# Patient Record
Sex: MALE | Race: WHITE | NOT HISPANIC OR LATINO | Employment: FULL TIME | URBAN - METROPOLITAN AREA
[De-identification: names, ages, dates, MRNs, and addresses within clinical notes are randomized per-mention and may not be internally consistent; named-entity substitution may affect disease eponyms.]

---

## 2019-03-22 ENCOUNTER — CONSULT (OUTPATIENT)
Dept: SLEEP MEDICINE | Facility: HOSPITAL | Age: 54
End: 2019-03-22

## 2019-03-22 VITALS
SYSTOLIC BLOOD PRESSURE: 149 MMHG | HEIGHT: 70 IN | HEART RATE: 57 BPM | OXYGEN SATURATION: 94 % | WEIGHT: 296.4 LBS | DIASTOLIC BLOOD PRESSURE: 87 MMHG | BODY MASS INDEX: 42.43 KG/M2

## 2019-03-22 DIAGNOSIS — E66.01 MORBID OBESITY (HCC): ICD-10-CM

## 2019-03-22 DIAGNOSIS — R06.83 SNORING: Primary | ICD-10-CM

## 2019-03-22 DIAGNOSIS — G47.33 OBSTRUCTIVE SLEEP APNEA, ADULT: ICD-10-CM

## 2019-03-22 PROCEDURE — 99204 OFFICE O/P NEW MOD 45 MIN: CPT | Performed by: INTERNAL MEDICINE

## 2019-03-22 RX ORDER — FLUVOXAMINE MALEATE 150 MG/1
CAPSULE, EXTENDED RELEASE ORAL
COMMUNITY
Start: 2019-02-05

## 2019-03-22 RX ORDER — LAMOTRIGINE 200 MG/1
TABLET ORAL
COMMUNITY
Start: 2019-02-05

## 2019-03-22 RX ORDER — BUPROPION HYDROCHLORIDE 300 MG/1
TABLET ORAL
COMMUNITY
Start: 2019-02-05

## 2019-03-22 RX ORDER — BUSPIRONE HYDROCHLORIDE 30 MG/1
TABLET ORAL
COMMUNITY
Start: 2019-03-01

## 2019-03-22 RX ORDER — PROPRANOLOL HYDROCHLORIDE 80 MG/1
CAPSULE, EXTENDED RELEASE ORAL
COMMUNITY
Start: 2019-02-19

## 2019-03-22 RX ORDER — AMLODIPINE BESYLATE 10 MG/1
TABLET ORAL
COMMUNITY
Start: 2019-02-05

## 2019-03-22 RX ORDER — LOSARTAN POTASSIUM 100 MG/1
TABLET ORAL
COMMUNITY
Start: 2019-01-08

## 2019-03-24 PROBLEM — L03.012 CELLULITIS OF FINGER OF LEFT HAND: Status: ACTIVE | Noted: 2019-03-24

## 2019-03-24 PROBLEM — T14.8XXA SPLINTER IN SKIN: Status: ACTIVE | Noted: 2019-03-24

## 2019-03-24 NOTE — PROGRESS NOTES
Subjective   Jono Lennon is a 53 y.o. male is being seen for consultation today at the request of Dr. Odell for the evaluation of snoring and obstructive sleep apnea.  He is also seen by Dr. Joao Palmer.    History of Present Illness  Patient says he had snoring noted for at least 15 years apnea is been for about 12 years.  He had a sleep study until possible in 2009 which showed significant sleep apnea.  He has been on CPAP since then but he says that he cannot tell much difference.  He says he feels about the same.  He still is using the same machine that he had in 2009.  He has gained about 60 pounds since that study.  He denies snoring when he is wearing the mask.  He says he is not rested in the morning.  He has a morning headache about 1 day/month.  He will fall asleep if sitting quietly during the day.  he naps sometimes 1-4 hours during the day.  He denies any problems while driving.  He is sleepy even if he increases his time in bed.    Without the machine he snores loudly and snores in all positions.  He is awakened with a dry mouth, gasping, coughing, and with a sore throat.  He has trouble breathing through his nose both day and night but denies ever breaking his nose.  Occasional reflux symptoms but controls it with diet over-the-counter medications.  He denies any hypnagogic hallucinations or sleep paralysis.  He has occasional kicking of his legs at night.  He is not on any medications.  He denies any chronic pain.  He thinks his weight is at the same for the past year.    To get about 11 PM.  He will fall asleep in 30 minutes although it has taken him as his 2 hours.  He thinks he is awake and 10-20 times during the night.  He arises at 5 AM and thinks he gets about 6 hours of sleep.  He often naps in the evenings.  This may be 2-4 hours.  He has a history of hypertension known for 15 years.  He denies any history of diabetes or coronary artery disease.  He has a history of anxiety, depression, and  OCD as well as ADHD.  Allergies   Allergen Reactions   • Penicillins Hives     As a child   He also has environmental allergies.      Current Outpatient Medications:   •  amLODIPine (NORVASC) 10 MG tablet, , Disp: , Rfl:   •  Amphetamine-Dextroamphetamine (ADDERALL PO), Take 20 mg by mouth 2 (Two) Times a Day., Disp: , Rfl:   •  buPROPion XL (WELLBUTRIN XL) 300 MG 24 hr tablet, , Disp: , Rfl:   •  busPIRone (BUSPAR) 30 MG tablet, , Disp: , Rfl:   •  Dextroamphetamine Sulfate (ZENZEDI PO), Take  by mouth., Disp: , Rfl:   •  fluvoxaMINE maleate  MG capsule sustained-release 24 hr, , Disp: , Rfl:   •  lamoTRIgine (LaMICtal) 200 MG tablet, , Disp: , Rfl:   •  losartan (COZAAR) 100 MG tablet, , Disp: , Rfl:   •  propranolol LA (INDERAL LA) 80 MG 24 hr capsule, , Disp: , Rfl:     Social History    Tobacco Use      Smoking status: Passive Smoke Exposure - Never Smoker      Smokeless tobacco: Never Used       Social History     Substance and Sexual Activity   Alcohol Use Yes    Comment: rare-less than 1 drink a month       Caffeine: He has 1 cup of coffee per month but has a 12 pack of cola per day    Past Medical History:   Diagnosis Date   • Arthritis    • Hypertension    • Mental disorder        Surgical history: skin cancer removal.    Family History   Problem Relation Age of Onset   • Heart disease Mother    • Hypertension Mother    • Obesity Mother    • Sleep apnea Mother    • Hypersomnolence Mother    • COPD Mother    • Diabetes Father    • Obesity Father    • Sleep apnea Father    • Hypersomnolence Father    • COPD Father    • Asthma Father    • Cancer Father         lung       The following portions of the patient's history were reviewed and updated as appropriate: allergies, current medications, past family history, past medical history, past social history, past surgical history and problem list.    Review of Systems   Constitutional: Positive for fatigue.   HENT: Positive for hearing loss, postnasal drip  "and sinus pressure.    Eyes: Negative.    Respiratory: Positive for shortness of breath and wheezing.    Cardiovascular: Positive for leg swelling.   Gastrointestinal: Positive for constipation.        He complains of frequent heartburn.   Endocrine: Negative.    Genitourinary: Negative.    Musculoskeletal: Positive for arthralgias.   Skin: Negative.    Allergic/Immunologic: Positive for environmental allergies.   Neurological: Positive for tremors.   Hematological: Negative.    Psychiatric/Behavioral: Positive for confusion, decreased concentration and dysphoric mood. The patient is nervous/anxious.    [place] score 12/24    Objective     /87   Pulse 57   Ht 177.8 cm (70\")   Wt 134 kg (296 lb 6.4 oz)   SpO2 94%   BMI 42.53 kg/m²      Physical Exam   Constitutional: He is oriented to person, place, and time. He appears well-developed and well-nourished.   He is morbidly obese.   HENT:   Head: Normocephalic and atraumatic.   He has Mallampati class II anatomy and mild retrognathia   Eyes: EOM are normal. Pupils are equal, round, and reactive to light.   Neck: Normal range of motion. Neck supple.   Cardiovascular: Normal rate, regular rhythm and normal heart sounds.   Pulmonary/Chest: Effort normal and breath sounds normal.   Abdominal: Soft. Bowel sounds are normal.   Musculoskeletal: Normal range of motion. He exhibits no edema.   Neurological: He is alert and oriented to person, place, and time.   Skin: Skin is warm and dry.   Psychiatric: He has a normal mood and affect. His behavior is normal.   His sleep study at [facility] July 5, 2009 showed an AHI of 48.4.  He also had a periodic limb movement of the right index 17.1.  He had oxygen desaturations to 78% and 26.7% of his total sleep time desaturated state.      Assessment/Plan   Jono was seen today for sleeping problem.    Diagnoses and all orders for this visit:    Snoring  -     Polysomnography 4 or More Parameters With CPAP; Future  -     " Detailed AutoPAP Order    Obstructive sleep apnea, adult  -     Polysomnography 4 or More Parameters With CPAP; Future  -     Detailed AutoPAP Order    Morbid obesity (CMS/HCC)    Patient presents with history as noted.  He is previously been diagnosed with severe obstructive sleep apnea and has gained significant weight since that study.  He seems very likely to continue to have severe obstructive sleep apnea.  He does not seem to have a symptom control with pressure settings at present.  We need to proceed to a titration study to make sure he is on an adequate pressure.  We can also see if you are having significant leg movements.  He states that he is working on his diet.  We will see him after his study.  He is encouraged to lose weight.  He is encouraged to avoid alcohol and sedatives close to bedtime.  He is encouraged to practice lateral position sleep.         Phong Miller MD [facility]  Sleep Medicine  Pulmonary and Critical Care Medicine

## 2019-04-16 ENCOUNTER — APPOINTMENT (OUTPATIENT)
Dept: SLEEP MEDICINE | Facility: HOSPITAL | Age: 54
End: 2019-04-16

## 2019-06-24 ENCOUNTER — OFFICE VISIT (OUTPATIENT)
Dept: SLEEP MEDICINE | Facility: HOSPITAL | Age: 54
End: 2019-06-24

## 2019-06-24 VITALS
SYSTOLIC BLOOD PRESSURE: 149 MMHG | WEIGHT: 304.8 LBS | DIASTOLIC BLOOD PRESSURE: 85 MMHG | HEIGHT: 69 IN | HEART RATE: 60 BPM | BODY MASS INDEX: 45.14 KG/M2 | OXYGEN SATURATION: 95 %

## 2019-06-24 DIAGNOSIS — G47.33 OBSTRUCTIVE SLEEP APNEA, ADULT: Primary | ICD-10-CM

## 2019-06-24 DIAGNOSIS — G47.10 HYPERSOMNIA WITH SLEEP APNEA: ICD-10-CM

## 2019-06-24 DIAGNOSIS — G47.30 HYPERSOMNIA WITH SLEEP APNEA: ICD-10-CM

## 2019-06-24 PROCEDURE — 99213 OFFICE O/P EST LOW 20 MIN: CPT | Performed by: NURSE PRACTITIONER

## 2019-06-24 RX ORDER — CARIPRAZINE 3 MG/1
CAPSULE, GELATIN COATED ORAL
COMMUNITY
Start: 2019-06-20

## 2019-06-24 NOTE — PROGRESS NOTES
Subjective: Follow-up        Chief Complaint:   Chief Complaint   Patient presents with   • Follow-up       HPI:    Jono Lennon is a 53 y.o. male here for follow-up sleep apnea.  Patient was last seen 3/22/2019.  Patient was originally diagnosed in 2009.  He has been on CPAP since that time.  Patient states he is still excessively sleepy and must take a 2-hour nap each day.  Patient does not notice any difference being on CPAP than before he started.  Patient is sleeping 7 hours nightly and does feel refreshed for the first 2 hours after awakening.  Patient has an [place] score of 3/24.  Patient wishes to continue compliance with CPAP but does wish she was feeling better.  Patient is currently on Adderall and Dexedrine and this has not helped.        Current medications are:   Current Outpatient Medications:   •  amLODIPine (NORVASC) 10 MG tablet, , Disp: , Rfl:   •  Amphetamine-Dextroamphetamine (ADDERALL PO), Take 20 mg by mouth 2 (Two) Times a Day., Disp: , Rfl:   •  buPROPion XL (WELLBUTRIN XL) 300 MG 24 hr tablet, , Disp: , Rfl:   •  busPIRone (BUSPAR) 30 MG tablet, , Disp: , Rfl:   •  Dextroamphetamine Sulfate (ZENZEDI PO), Take  by mouth., Disp: , Rfl:   •  fluvoxaMINE maleate  MG capsule sustained-release 24 hr, , Disp: , Rfl:   •  lamoTRIgine (LaMICtal) 200 MG tablet, , Disp: , Rfl:   •  losartan (COZAAR) 100 MG tablet, , Disp: , Rfl:   •  propranolol LA (INDERAL LA) 80 MG 24 hr capsule, , Disp: , Rfl:   •  VRAYLAR 3 MG capsule, , Disp: , Rfl: .      The patient's relevant past medical, surgical, family and social history were reviewed and updated in Epic as appropriate.       Review of Systems   Constitutional: Positive for fatigue.   HENT: Positive for hearing loss.    Respiratory: Positive for apnea, shortness of breath and wheezing.    Cardiovascular: Positive for leg swelling.   Gastrointestinal: Positive for constipation.        Heartburn   Musculoskeletal: Positive for arthralgias.    Allergic/Immunologic: Positive for environmental allergies.   Neurological: Positive for tremors.   Psychiatric/Behavioral: Positive for confusion, decreased concentration and sleep disturbance. The patient is nervous/anxious.    All other systems reviewed and are negative.        Objective:    Physical Exam   Constitutional: He is oriented to person, place, and time. He appears well-developed and well-nourished.   HENT:   Head: Normocephalic and atraumatic.   Mouth/Throat: Oropharynx is clear and moist.   Mallampati 2 anatomy   Eyes: Conjunctivae are normal.   Neck: Neck supple. No thyromegaly present.   Cardiovascular: Normal rate and regular rhythm.   Pulmonary/Chest: Effort normal and breath sounds normal.   Lymphadenopathy:     He has no cervical adenopathy.   Neurological: He is alert and oriented to person, place, and time.   Skin: Skin is warm and dry.   Psychiatric: He has a normal mood and affect. His behavior is normal. Judgment and thought content normal.   Nursing note and vitals reviewed.  73/70 3 days of use.  Greater than 4-hour use 100%.  IPAP 90% pressure 12.5.  EPAP 90% pressure 8.1.  AHI 1.1.  Download reviewed with patient.      ASSESSMENT/PLAN    Don was seen today for follow-up.    Diagnoses and all orders for this visit:    Obstructive sleep apnea, adult  -     CPAP Therapy  -     Polysomnography 4 or More Parameters With CPAP; Future    Hypersomnia with sleep apnea  -     Polysomnography 4 or More Parameters With CPAP; Future            1. Counseled patient regarding multimodal approach with healthy nutrition, healthy sleep, regular physical activity, social activities, counseling, and medications. Encouraged to practice lateral sleep position. Avoid alcohol and sedatives close to bedtime.  2. Even though patient is compliant has a normal AHI he is still excessively sleepy in the day and does need to take a nap daily.  I scheduled a titration and I will see patient back 10 weeks after his  study to reassess.    I have reviewed the results of my evaluation and impression and discussed my recommendations in detail with the patient.      Signed by  Doris Mello, APRN    June 24, 2019      CC: Nicholas Odell MD          No ref. provider found

## 2019-08-08 ENCOUNTER — HOSPITAL ENCOUNTER (OUTPATIENT)
Dept: SLEEP MEDICINE | Facility: HOSPITAL | Age: 54
Discharge: HOME | End: 2019-08-08
Admitting: NURSE PRACTITIONER

## 2019-08-08 VITALS
BODY MASS INDEX: 43.84 KG/M2 | OXYGEN SATURATION: 97 % | HEIGHT: 70 IN | DIASTOLIC BLOOD PRESSURE: 88 MMHG | HEART RATE: 66 BPM | SYSTOLIC BLOOD PRESSURE: 151 MMHG | WEIGHT: 306.25 LBS

## 2019-08-08 DIAGNOSIS — G47.33 OBSTRUCTIVE SLEEP APNEA, ADULT: ICD-10-CM

## 2019-08-08 DIAGNOSIS — G47.30 HYPERSOMNIA WITH SLEEP APNEA: ICD-10-CM

## 2019-08-08 DIAGNOSIS — G47.10 HYPERSOMNIA WITH SLEEP APNEA: ICD-10-CM

## 2019-08-08 PROCEDURE — 95811 POLYSOM 6/>YRS CPAP 4/> PARM: CPT | Performed by: INTERNAL MEDICINE

## 2019-08-08 PROCEDURE — 95811 POLYSOM 6/>YRS CPAP 4/> PARM: CPT

## 2019-08-12 DIAGNOSIS — G47.61 PLMD (PERIODIC LIMB MOVEMENT DISORDER): ICD-10-CM

## 2019-08-12 DIAGNOSIS — G47.33 OSA TREATED WITH BIPAP: Primary | ICD-10-CM

## 2019-08-12 DIAGNOSIS — R06.83 SNORING: ICD-10-CM

## 2019-08-12 RX ORDER — ROPINIROLE 0.25 MG/1
0.25 TABLET, FILM COATED ORAL NIGHTLY
Qty: 30 TABLET | Refills: 2 | Status: SHIPPED | OUTPATIENT
Start: 2019-08-12 | End: 2019-08-30 | Stop reason: DRUGHIGH

## 2019-08-14 NOTE — PROGRESS NOTES
CALLED PATIENT REGARDING TITRATION RESULTS. REACHED  REQUESTING RETURN CALL TO SCHEDULE FU. THEREA RE OPENINGS ON KELLY'S SCHEDULED FOR 08/15/19 THAT I HAVE BLOCKED SHOULD HE CALL BACK TODAY

## 2019-08-30 ENCOUNTER — OFFICE VISIT (OUTPATIENT)
Dept: SLEEP MEDICINE | Facility: HOSPITAL | Age: 54
End: 2019-08-30

## 2019-08-30 VITALS
WEIGHT: 303 LBS | HEART RATE: 60 BPM | OXYGEN SATURATION: 94 % | SYSTOLIC BLOOD PRESSURE: 134 MMHG | DIASTOLIC BLOOD PRESSURE: 81 MMHG | BODY MASS INDEX: 43.38 KG/M2 | HEIGHT: 70 IN

## 2019-08-30 DIAGNOSIS — G47.33 OSA (OBSTRUCTIVE SLEEP APNEA): Primary | ICD-10-CM

## 2019-08-30 DIAGNOSIS — G47.61 PLMD (PERIODIC LIMB MOVEMENT DISORDER): ICD-10-CM

## 2019-08-30 DIAGNOSIS — G47.30 HYPERSOMNIA WITH SLEEP APNEA: ICD-10-CM

## 2019-08-30 DIAGNOSIS — G47.10 HYPERSOMNIA WITH SLEEP APNEA: ICD-10-CM

## 2019-08-30 PROCEDURE — 99213 OFFICE O/P EST LOW 20 MIN: CPT | Performed by: NURSE PRACTITIONER

## 2019-08-30 RX ORDER — ROPINIROLE 0.5 MG/1
0.5 TABLET, FILM COATED ORAL NIGHTLY
Qty: 30 TABLET | Refills: 4 | Status: SHIPPED | OUTPATIENT
Start: 2019-08-30 | End: 2020-09-23

## 2019-08-30 NOTE — PROGRESS NOTES
Chief Complaint:   Chief Complaint   Patient presents with   • Follow-up       HPI:    Jono Lennon is a 53 y.o. male here for follow-up of sleep apnea and hypersomnia with compliance of CPAP therapy.  Patient also has PLMD.  Patient had a titration study for excessive sleepiness on 8/8/2019 that showed moderate obstructive sleep apnea.  Patient was adjusted to 25/15 settings.  Patient states he is currently on the settings does not see any difference in his hypersomnia.  Patient was also started on Requip 0.25 mg but does not feel he has noticed any difference.  We have discussed increasing that today and patient would like to do so.  Of note, patient is on Adderall 20 mg twice daily also Dexedrine twice daily for ADD.  Patient does not feel these are helping with his hypersomnia.  Patient states he sleeps approximately 10 hours daily and has no problem going to sleep at bedtime is sleeping all night.        Current medications are:   Current Outpatient Medications:   •  amLODIPine (NORVASC) 10 MG tablet, , Disp: , Rfl:   •  Amphetamine-Dextroamphetamine (ADDERALL PO), Take 20 mg by mouth 2 (Two) Times a Day., Disp: , Rfl:   •  azithromycin (ZITHROMAX) 250 MG tablet, Take 1 tablet by mouth Daily. Take 2 tablets the first day, then 1 tablet daily for 4 days., Disp: 6 tablet, Rfl: 0  •  buPROPion XL (WELLBUTRIN XL) 300 MG 24 hr tablet, , Disp: , Rfl:   •  busPIRone (BUSPAR) 30 MG tablet, , Disp: , Rfl:   •  Dextroamphetamine Sulfate (ZENZEDI PO), Take  by mouth., Disp: , Rfl:   •  fluvoxaMINE maleate  MG capsule sustained-release 24 hr, , Disp: , Rfl:   •  lamoTRIgine (LaMICtal) 200 MG tablet, , Disp: , Rfl:   •  losartan (COZAAR) 100 MG tablet, , Disp: , Rfl:   •  propranolol LA (INDERAL LA) 80 MG 24 hr capsule, , Disp: , Rfl:   •  VRAYLAR 3 MG capsule, , Disp: , Rfl:   •  predniSONE (DELTASONE) 10 MG (21) tablet pack, Take  by mouth Daily. Use as directed on package, Disp: 21 tablet, Rfl: 0  •  rOPINIRole  (REQUIP) 0.5 MG tablet, Take 1 tablet by mouth Every Night. Take 1 hour before bedtime., Disp: 30 tablet, Rfl: 4.      The patient's relevant past medical, surgical, family and social history were reviewed and updated in Epic as appropriate.       Review of Systems   Constitutional: Positive for fatigue.   HENT: Positive for hearing loss.    Respiratory: Positive for apnea, shortness of breath and wheezing.    Cardiovascular: Positive for leg swelling.   Gastrointestinal: Positive for constipation.        Heartburn   Musculoskeletal: Positive for arthralgias.   Allergic/Immunologic: Positive for environmental allergies.   Neurological: Positive for tremors.   Psychiatric/Behavioral: Positive for confusion, decreased concentration and sleep disturbance. The patient is nervous/anxious.    All other systems reviewed and are negative.        Objective:    Physical Exam   Constitutional: He is oriented to person, place, and time. He appears well-developed and well-nourished.   HENT:   Head: Normocephalic and atraumatic.   Mouth/Throat: Oropharynx is clear and moist.   Class 2 airway   Eyes: Conjunctivae are normal.   Neck: Neck supple. No thyromegaly present.   Cardiovascular: Normal rate and regular rhythm.   Pulmonary/Chest: Effort normal and breath sounds normal.   Lymphadenopathy:     He has no cervical adenopathy.   Neurological: He is alert and oriented to person, place, and time.   Skin: Skin is warm and dry.   Psychiatric: He has a normal mood and affect. His behavior is normal. Judgment and thought content normal.   Nursing note and vitals reviewed.        ASSESSMENT/PLAN    Jono was seen today for follow-up.    Diagnoses and all orders for this visit:    REJI (obstructive sleep apnea)  -     CPAP Therapy    PLMD (periodic limb movement disorder)  -     rOPINIRole (REQUIP) 0.5 MG tablet; Take 1 tablet by mouth Every Night. Take 1 hour before bedtime.    Hypersomnia with sleep apnea            1. Counseled patient  regarding multimodal approach with healthy nutrition, healthy sleep, regular physical activity, social activities, counseling, and medications. Encouraged to practice lateral  sleep position. Avoid alcohol and sedatives close to bedtime.  2. Fill supplies x1 year.  3. Increase Requip to 0.5 mg 1 hour before bedtime #30 with 3 refills given.  4. We will try to get control of patient's restless legs before trying any different medications.  He is already on large dose of stimulants and we do not want to change this at this time.  This plan of care has been discussed with Dr. Phong Miller.  See patient back in 8 weeks to reassess.  I have reviewed the results of my evaluation and impression and discussed my recommendations in detail with the patient.      Signed by  KIARRA Ennis    August 30, 2019      CC: Nicholas Odell MD          No ref. provider found

## 2020-09-23 ENCOUNTER — TELEMEDICINE (OUTPATIENT)
Dept: SLEEP MEDICINE | Facility: HOSPITAL | Age: 55
End: 2020-09-23

## 2020-09-23 VITALS — BODY MASS INDEX: 42.95 KG/M2 | WEIGHT: 300 LBS | HEIGHT: 70 IN

## 2020-09-23 DIAGNOSIS — G47.61 PLMD (PERIODIC LIMB MOVEMENT DISORDER): ICD-10-CM

## 2020-09-23 DIAGNOSIS — G47.30 HYPERSOMNIA WITH SLEEP APNEA: ICD-10-CM

## 2020-09-23 DIAGNOSIS — G47.33 OSA (OBSTRUCTIVE SLEEP APNEA): Primary | ICD-10-CM

## 2020-09-23 DIAGNOSIS — G47.10 HYPERSOMNIA WITH SLEEP APNEA: ICD-10-CM

## 2020-09-23 PROCEDURE — 99213 OFFICE O/P EST LOW 20 MIN: CPT | Performed by: NURSE PRACTITIONER

## 2020-09-23 RX ORDER — PRAMIPEXOLE DIHYDROCHLORIDE 1 MG/1
1 TABLET ORAL DAILY
Qty: 30 TABLET | Refills: 3 | Status: SHIPPED | OUTPATIENT
Start: 2020-09-23

## 2020-09-23 NOTE — PROGRESS NOTES
"    Chief Complaint:   Chief Complaint   Patient presents with   • Follow-up       HPI:    Jono Lennon is a 54 y.o. male here for follow-up of sleep apnea.  Patient was last seen 8/30/2019 for REJI, PLMD, and hypersomnia.  Patient does do well on BiPAP therapy and is compliant.  Patient states he does not feel he is getting enough \"air pressure.\"  He is asking today if we can increase his pressure.  I have explained he has an AHI of 0.5 but if he is not comfortable we will certainly increase these pressures.  Patient was on Requip and states this did make his restless leg worsen.  Patient stopped medication.  I have discussed today starting patient on Mirapex and he does wish to move forward with this plan of care.  Patient is on Adderall and Dexedrine per his psych and those have recently been adjusted to help with his hypersomnia however this has not worked.  Patient is still very sleepy throughout the day stating he sleeps 15 hours out of a 24-hour.  And stays tired most every day.  Patient has an [place] score of 10/24.  I discussed with him again today trying an increase pressure on BiPAP before adding other medications.  Patient does agree to this plan of care.        Current medications are:   Current Outpatient Medications:   •  amLODIPine (NORVASC) 10 MG tablet, , Disp: , Rfl:   •  Amphetamine-Dextroamphetamine (ADDERALL PO), Take 20 mg by mouth 2 (Two) Times a Day., Disp: , Rfl:   •  buPROPion XL (WELLBUTRIN XL) 300 MG 24 hr tablet, , Disp: , Rfl:   •  busPIRone (BUSPAR) 30 MG tablet, , Disp: , Rfl:   •  Dextroamphetamine Sulfate (ZENZEDI PO), Take  by mouth., Disp: , Rfl:   •  fluvoxaMINE maleate  MG capsule sustained-release 24 hr, , Disp: , Rfl:   •  lamoTRIgine (LaMICtal) 200 MG tablet, , Disp: , Rfl:   •  losartan (COZAAR) 100 MG tablet, , Disp: , Rfl:   •  propranolol LA (INDERAL LA) 80 MG 24 hr capsule, , Disp: , Rfl:   •  VRAYLAR 3 MG capsule, , Disp: , Rfl:   •  pramipexole (Mirapex) 1 MG " tablet, Take 1 tablet by mouth Daily., Disp: 30 tablet, Rfl: 3.      The patient's relevant past medical, surgical, family and social history were reviewed and updated in Epic as appropriate.       Review of Systems   HENT: Positive for hearing loss.    Eyes: Positive for visual disturbance.   Respiratory: Positive for apnea, shortness of breath and wheezing.    Cardiovascular: Positive for leg swelling.   Gastrointestinal: Positive for constipation.        Heartburn     Musculoskeletal: Positive for arthralgias.   Allergic/Immunologic: Positive for environmental allergies.   Neurological: Positive for tremors.   Psychiatric/Behavioral: Positive for confusion, decreased concentration, dysphoric mood and sleep disturbance. The patient is nervous/anxious.    All other systems reviewed and are negative.        Objective:    Physical Exam  Constitutional:       Appearance: Normal appearance.   HENT:      Head: Normocephalic and atraumatic.      Mouth/Throat:      Mouth: Mucous membranes are moist.      Pharynx: Oropharynx is clear.      Comments: Class 2 airway  Pulmonary:      Effort: Pulmonary effort is normal.   Musculoskeletal: Normal range of motion.   Skin:     Coloration: Skin is not jaundiced or pale.      Findings: No erythema.   Neurological:      Mental Status: He is alert and oriented to person, place, and time.   Psychiatric:         Mood and Affect: Mood normal.         Behavior: Behavior normal.         Thought Content: Thought content normal.         Judgment: Judgment normal.     90 percent IPAP 20.290% EPAP 15.0  Greater than 4-hour use 96.7  AHI of 0.5.      ASSESSMENT/PLAN    Jono was seen today for follow-up.    Diagnoses and all orders for this visit:    REJI (obstructive sleep apnea)  -     CPAP Therapy    PLMD (periodic limb movement disorder)    Hypersomnia with sleep apnea    Other orders  -     pramipexole (Mirapex) 1 MG tablet; Take 1 tablet by mouth Daily.            1. Counseled patient  regarding multimodal approach with healthy nutrition, healthy sleep, regular physical activity, social activities, counseling, and medications. Encouraged to practice lateral  sleep position. Avoid alcohol and sedatives close to bedtime.  2.   Increase pressure 20/25  Mirapex 1 mg p.o. nightly #30 with 3 refills.  No changes to hypersomnia medications at this time but we will reevaluate in 5 weeks.  Patient gave verbal consent for video visit.  I have reviewed the results of my evaluation and impression and discussed my recommendations in detail with the patient.      Signed by  KIARRA Ennis    September 23, 2020      CC: Nicholas Odell MD          No ref. provider found